# Patient Record
Sex: FEMALE | Race: WHITE | NOT HISPANIC OR LATINO | Employment: OTHER | URBAN - METROPOLITAN AREA
[De-identification: names, ages, dates, MRNs, and addresses within clinical notes are randomized per-mention and may not be internally consistent; named-entity substitution may affect disease eponyms.]

---

## 2017-05-12 PROBLEM — F02.818 LATE ONSET ALZHEIMER'S DISEASE WITH BEHAVIORAL DISTURBANCE: Status: ACTIVE | Noted: 2017-05-12

## 2017-05-12 PROBLEM — G30.1 LATE ONSET ALZHEIMER'S DISEASE WITH BEHAVIORAL DISTURBANCE: Status: ACTIVE | Noted: 2017-05-12

## 2017-05-12 PROBLEM — I48.0 PAF (PAROXYSMAL ATRIAL FIBRILLATION): Status: ACTIVE | Noted: 2017-05-12

## 2019-04-15 ENCOUNTER — HOSPITAL ENCOUNTER (EMERGENCY)
Facility: HOSPITAL | Age: 84
Discharge: HOME | End: 2019-04-16
Attending: EMERGENCY MEDICINE
Payer: MEDICARE

## 2019-04-15 DIAGNOSIS — R51.9 HEADACHE: ICD-10-CM

## 2019-04-15 DIAGNOSIS — Z71.1 PERSON WITH FEARED COMPLAINT IN WHOM NO DIAGNOSIS IS MADE: Primary | ICD-10-CM

## 2019-04-15 PROCEDURE — 99284 EMERGENCY DEPT VISIT MOD MDM: CPT

## 2019-04-15 PROCEDURE — 93005 ELECTROCARDIOGRAM TRACING: CPT

## 2019-04-15 RX ORDER — POLYETHYLENE GLYCOL 3350 17 G/17G
POWDER, FOR SOLUTION ORAL DAILY
COMMUNITY

## 2019-04-15 RX ORDER — ACETAMINOPHEN 500 MG
1000 TABLET ORAL EVERY 8 HOURS PRN
COMMUNITY

## 2019-04-15 RX ORDER — CARBOXYMETHYLCELLULOSE SODIUM 10 MG/ML
1 GEL OPHTHALMIC
COMMUNITY

## 2019-04-16 VITALS
OXYGEN SATURATION: 96 % | HEART RATE: 78 BPM | TEMPERATURE: 98 F | SYSTOLIC BLOOD PRESSURE: 142 MMHG | DIASTOLIC BLOOD PRESSURE: 68 MMHG | RESPIRATION RATE: 16 BRPM

## 2019-04-16 NOTE — ED NOTES
Pt in bed appears to be resting comfortably. Son at bedside reports pt returned from CT agitated. NAD noted.

## 2019-04-16 NOTE — ED NOTES
Per EMS, [facility] called for patient with low oxygen saturation.  Upon arrival, EMS reports that patient oxygen saturation was 95% on room air.  Pt's only complaint was a headache and dizziness upon standing.  Pt denies headache or dizziness at this time.  Pt denies fall.  EMS states that they could not confirm if patient fell or not.  Pt with hx of dementia, poor historian.  Connected to cardiac monitor, automatic BP and continuous pulse oximetry.

## 2019-04-16 NOTE — ED PROVIDER NOTES
Encounter Date: 4/15/2019       History     Chief Complaint   Patient presents with    Headache     from [facility] ems reports inital call for [facility] nursing staff reported O2 sat 85%, ems reports upon arrival sat 95%. pt has hx of dementia. ems reports that pt c/o headache from possible fall which was not witnessed.      89-year-old female with PMH of HTN, senile dementia, TIA, AFib presents the ED via EMS with reports of EMS being called to [facility] for low oxygen saturation at 85%.  EMS states on upon their arrival patient was satting at 95% on room air.  Patient has dementia and is a poor historian.  She initially told EMS that she had a headache and is prior living states that she could have fallen but no fall was actually witnessed.  Upon my examination the patient denies any headache or complaints at this time.    The history is provided by the patient and the EMS personnel. No  was used.     Review of patient's allergies indicates:   Allergen Reactions    Adhesive      History reviewed. No pertinent past medical history.  History reviewed. No pertinent surgical history.  History reviewed. No pertinent family history.  Social History     Tobacco Use    Smoking status: Never Smoker    Smokeless tobacco: Never Used   Substance Use Topics    Alcohol use: No    Drug use: No     Review of Systems   Unable to perform ROS: Dementia       Physical Exam     Initial Vitals [04/15/19 1948]   BP Pulse Resp Temp SpO2   129/78 78 20 98.5 °F (36.9 °C) 97 %      MAP       --         Physical Exam    Nursing note and vitals reviewed.  Constitutional: Vital signs are normal. She appears well-developed and well-nourished. She does not appear ill. No distress.   HENT:   Head: Normocephalic and atraumatic. Head is without abrasion, without contusion and without laceration.   Nose: Nose normal.   Eyes: Conjunctivae, EOM and lids are normal. Pupils are equal, round, and reactive to light.   Neck:  Normal range of motion and phonation normal.   Cardiovascular: Normal rate, regular rhythm and normal heart sounds.   Pulmonary/Chest: Effort normal and breath sounds normal.   Abdominal: Soft. Normal appearance and bowel sounds are normal.   Musculoskeletal: Normal range of motion.   Neurological: She is alert and oriented to person, place, and time.   No focal neurological deficits   Skin: Skin is warm and dry.   Psychiatric: She has a normal mood and affect. Her behavior is normal. Judgment and thought content normal. Cognition and memory are impaired.   Speech is incoherent         ED Course   Procedures  Labs Reviewed - No data to display       Imaging Results          CT Head Without Contrast (Final result)  Result time 04/15/19 22:22:53    Final result by Kian James MD (04/15/19 22:22:53)                 Impression:      No acute intracranial abnormalities identified.      Electronically signed by: Kian James MD  Date:    04/15/2019  Time:    22:22             Narrative:    EXAMINATION:  CT HEAD WITHOUT CONTRAST    CLINICAL HISTORY:  Head trauma, headache;    TECHNIQUE:  Low dose axial images were obtained through the head.  Coronal and sagittal reformations were also performed. Contrast was not administered.    COMPARISON:  None.    FINDINGS:  There is generalized cerebral volume loss with moderate chronic microvascular ischemic disease.  No evidence of acute/recent major vascular distribution cerebral infarction, intraparenchymal hemorrhage, or intra-axial space occupying lesion.  Prominent bilateral basal ganglia calcifications are noted.  The ventricular system is normal in size and configuration with no evidence of hydrocephalus. No effacement of the skull-base cisterns. No abnormal extra-axial fluid collections or blood products. Visualized paranasal sinuses and mastoid air cells are clear. The calvarium shows no significant abnormality.                                 Medical Decision  Making:   Initial Assessment:   89-year-old female sent from [facility] with reports of oxygen saturation 85%.  Patient is satting at 95% on room air in ED. EMS reports that the nursing home states she could have fallen but no fall was witnessed.  Patient reported headache to EMS. She denies any complaints at this time and does not know why the nursing home sent her to the ED.   Differential Diagnosis:   Differential Diagnosis includes, but is not limited to:  Ischemic stroke, hemorrhagic stroke, subarachnoid hemorrhage/ruptured aneurysm, intracranial lesion/mass, meningitis/encephalitis, epidural hematoma, subdural hematoma, pseudotumor cerebri, venous sinus thrombosis, CO poisoning, hypertensive encephalopathy, MI/ACS, head trauma/contusion, concussion, sinus headache, dehydration, anxiety, medication non-compliance, primary headache (tension/cluster/migraine).    Clinical Tests:   Radiological Study: Ordered and Reviewed  ED Management:  CT shows no acute intracranial abnormalities.  Patient has remained at 94-95% oxygen saturation while in ED.  She continues to have no complaints.  She will be discharged back to [facility] with instructions to the living facility to follow up with her PCP.  Return to ED with any new or worsening symptoms.              Attending Attestation:     Physician Attestation Statement for NP/PA:   I discussed this assessment and plan of this patient with the NP/PA, but I did not personally examine the patient. The face to face encounter was performed by the NP/PA.                     Clinical Impression:       ICD-10-CM ICD-9-CM   1. Person with feared complaint in whom no diagnosis is made Z71.1 V65.5   2. Headache R51 784.0                                Guillermina Banuelos PA-C  04/15/19 [id number]       James Morin MD  04/15/19 0422